# Patient Record
Sex: FEMALE | Race: ASIAN | NOT HISPANIC OR LATINO | URBAN - METROPOLITAN AREA
[De-identification: names, ages, dates, MRNs, and addresses within clinical notes are randomized per-mention and may not be internally consistent; named-entity substitution may affect disease eponyms.]

---

## 2020-01-25 ENCOUNTER — EMERGENCY (EMERGENCY)
Facility: HOSPITAL | Age: 55
LOS: 1 days | Discharge: HOME | End: 2020-01-25
Attending: STUDENT IN AN ORGANIZED HEALTH CARE EDUCATION/TRAINING PROGRAM | Admitting: STUDENT IN AN ORGANIZED HEALTH CARE EDUCATION/TRAINING PROGRAM
Payer: MEDICAID

## 2020-01-25 VITALS
SYSTOLIC BLOOD PRESSURE: 138 MMHG | HEART RATE: 73 BPM | OXYGEN SATURATION: 100 % | DIASTOLIC BLOOD PRESSURE: 63 MMHG | TEMPERATURE: 98 F | RESPIRATION RATE: 18 BRPM

## 2020-01-25 PROCEDURE — 99284 EMERGENCY DEPT VISIT MOD MDM: CPT

## 2020-01-25 NOTE — ED ADULT TRIAGE NOTE - CHIEF COMPLAINT QUOTE
As Per Interpretation pt st"I have severe back pain since yesterday that is worsening also I also have head pain and pain in both hands and both legs."Denies pain on urination. " Pt st but yesterday I felt like I couldn't control urine few days ago while bringing out garbage I felt pain in leg...did not fall down....pain starts in lower back and now travels in to mid back. " Pt denies  med hx  Pt st" Pain worse with movement "  Pt takes Gabentin " for her chronic leg pains."

## 2020-01-26 VITALS
RESPIRATION RATE: 16 BRPM | SYSTOLIC BLOOD PRESSURE: 113 MMHG | DIASTOLIC BLOOD PRESSURE: 63 MMHG | HEART RATE: 71 BPM | OXYGEN SATURATION: 100 % | TEMPERATURE: 98 F

## 2020-01-26 LAB
APPEARANCE UR: CLEAR — SIGNIFICANT CHANGE UP
BACTERIA # UR AUTO: NEGATIVE — SIGNIFICANT CHANGE UP
BILIRUB UR-MCNC: NEGATIVE — SIGNIFICANT CHANGE UP
BLOOD UR QL VISUAL: SIGNIFICANT CHANGE UP
COLOR SPEC: SIGNIFICANT CHANGE UP
GLUCOSE UR-MCNC: NEGATIVE — SIGNIFICANT CHANGE UP
HYALINE CASTS # UR AUTO: NEGATIVE — SIGNIFICANT CHANGE UP
KETONES UR-MCNC: NEGATIVE — SIGNIFICANT CHANGE UP
LEUKOCYTE ESTERASE UR-ACNC: SIGNIFICANT CHANGE UP
NITRITE UR-MCNC: NEGATIVE — SIGNIFICANT CHANGE UP
PH UR: 6 — SIGNIFICANT CHANGE UP (ref 5–8)
PROT UR-MCNC: NEGATIVE — SIGNIFICANT CHANGE UP
RBC CASTS # UR COMP ASSIST: SIGNIFICANT CHANGE UP (ref 0–?)
SP GR SPEC: 1.01 — SIGNIFICANT CHANGE UP (ref 1–1.04)
SQUAMOUS # UR AUTO: SIGNIFICANT CHANGE UP
UROBILINOGEN FLD QL: NORMAL — SIGNIFICANT CHANGE UP
WBC UR QL: SIGNIFICANT CHANGE UP (ref 0–?)

## 2020-01-26 RX ORDER — KETOROLAC TROMETHAMINE 30 MG/ML
15 SYRINGE (ML) INJECTION ONCE
Refills: 0 | Status: DISCONTINUED | OUTPATIENT
Start: 2020-01-26 | End: 2020-01-26

## 2020-01-26 RX ORDER — LIDOCAINE 4 G/100G
1 CREAM TOPICAL ONCE
Refills: 0 | Status: COMPLETED | OUTPATIENT
Start: 2020-01-26 | End: 2020-01-26

## 2020-01-26 RX ORDER — ACETAMINOPHEN 500 MG
975 TABLET ORAL ONCE
Refills: 0 | Status: COMPLETED | OUTPATIENT
Start: 2020-01-26 | End: 2020-01-26

## 2020-01-26 RX ADMIN — Medication 975 MILLIGRAM(S): at 03:50

## 2020-01-26 RX ADMIN — Medication 975 MILLIGRAM(S): at 02:50

## 2020-01-26 RX ADMIN — Medication 15 MILLIGRAM(S): at 03:05

## 2020-01-26 RX ADMIN — Medication 15 MILLIGRAM(S): at 02:50

## 2020-01-26 RX ADMIN — LIDOCAINE 1 PATCH: 4 CREAM TOPICAL at 02:51

## 2020-01-26 NOTE — ED PROVIDER NOTE - PATIENT PORTAL LINK FT
You can access the FollowMyHealth Patient Portal offered by [facility] by registering at the following website: [email or web address]. By joining [employer]’s FollowMyHealth portal, you will also be able to view your health information using other applications (apps) compatible with our system.

## 2020-01-26 NOTE — ED PROVIDER NOTE - MUSCULOSKELETAL, MLM
right lower paraspinal TTP, lumbar midline TTP, ROM intact BL LUE with mild pain in lower back with hip flexion. nontender otherwise. right lower lumbar paraspinal TTP, no midline TTP, ROM intact. lower back pain reproduced with hip flexion bilaterally. upper ext with FROM and no tenderness.

## 2020-01-26 NOTE — ED PROVIDER NOTE - OBJECTIVE STATEMENT
56 yo female with pmhx nerve pain in LE on gabapentin presenting with 2 days of back pain and lower abd pain. states that pain has been in lower right back, now radiating to mid back. did not take any medication for it. Denies any trauma, falls, lifting anything heavy. Also endorsing BL shoulder pain, and lower abd pain. Came to ED for further evaluation. Has numbness in LE but that has been baseline    Denies dysuria, incontinence, LOC, falls, SOB, CP, fevers

## 2020-01-26 NOTE — ED PROVIDER NOTE - CLINICAL SUMMARY MEDICAL DECISION MAKING FREE TEXT BOX
56 yo female with unremarkable pmhx presenting with Lower back pain. suggestive musculoskeletal pain vs bony pain, however does not have any alarm symptoms (no urinary retention, no fecal incontinence, no focal neuro deficits). will give pain medication and will reassess Vinayak Brito DO: 54 yo female with unremarkable pmhx presenting with Lower back pain. suggestive musculoskeletal pain vs bony pain. No urinary retention, no fecal incontinence, no focal neuro deficits, fevers, trauma,hx of cancer. no neck pain and neck FROM without tenderness. plan: ua to asses for occult uti. symptom relief, reassess.

## 2020-01-26 NOTE — ED ADULT NURSE NOTE - OBJECTIVE STATEMENT
54y/o female aaox4 and ambulatory primarily Nepali speaking; denies translation services at this time, requests daughter at bedside to translate. Pt states back pain started yesterday and has been worsening. Pt states pain sometimes radiates to R arm and to legs. Pt states she has chronic R leg pain which she takes medications for. Pt observed ambulating to rm 26 without difficulty. Pt denies chest pain, sOB, N+V, diarrhea, dysuria, vision changes, palpitations. Vital signs as noted. Awaiting MD haskins at this time. Will continue to monitor.

## 2020-01-27 LAB
BACTERIA UR CULT: SIGNIFICANT CHANGE UP
SPECIMEN SOURCE: SIGNIFICANT CHANGE UP

## 2020-02-07 NOTE — ED PROVIDER NOTE - SKIN, MLM
Skin normal color for race, warm, dry and intact. No evidence of rash.
Universal Safety Interventions

## 2024-06-24 NOTE — ED ADULT TRIAGE NOTE - PAIN RATING/NUMBER SCALE (0-10): REST
Has patient had flu shot for current/most recent flu season? If so, when? No        Is the patient currently in the state of [place]? YES    Visit mode:VIDEO    If the visit is dropped, the patient can be reconnected by: VIDEO VISIT: Send to e-mail at: [email or web address]    Will anyone else be joining the visit? NO  (If patient encounters technical issues they should call [phone]    How would you like to obtain your AVS? MyChart    Are changes needed to the allergy or medication list? Yes add medication -  magnesium 4,000 MG per day.     Are refills needed on medications prescribed by this physician? NO    Reason for visit: Consult    Cassidy ALEGRE      
8